# Patient Record
Sex: FEMALE | Race: BLACK OR AFRICAN AMERICAN | NOT HISPANIC OR LATINO
[De-identification: names, ages, dates, MRNs, and addresses within clinical notes are randomized per-mention and may not be internally consistent; named-entity substitution may affect disease eponyms.]

---

## 2023-05-22 ENCOUNTER — APPOINTMENT (OUTPATIENT)
Dept: ORTHOPEDIC SURGERY | Facility: CLINIC | Age: 68
End: 2023-05-22
Payer: MEDICARE

## 2023-05-22 PROBLEM — Z00.00 ENCOUNTER FOR PREVENTIVE HEALTH EXAMINATION: Status: ACTIVE | Noted: 2023-05-22

## 2023-05-22 PROCEDURE — 99203 OFFICE O/P NEW LOW 30 MIN: CPT

## 2023-05-22 RX ORDER — CELECOXIB 200 MG/1
200 CAPSULE ORAL
Qty: 30 | Refills: 0 | Status: ACTIVE | COMMUNITY
Start: 2023-05-22 | End: 1900-01-01

## 2023-05-22 NOTE — PHYSICAL EXAM
[de-identified] : Right knee on exam today there is definite warmth.  She has tenderness with compression of the patellofemoral articulation range of motion 0 to 125 degrees.  The knee is stable to stress varus valgus stress in both full extension and 90 degrees of flexion.  Patient has an intact extensor mechanism.  There is some tenderness with pression of the medial joint line as well. [de-identified] : Knee radiographs were ordered today AP standing individual lateral sunrise views were obtained patient shows significant narrowing of the patellofemoral articulation on the sunrise view mild narrowing of the medial compartment on standing AP projection.  No evidence of any recent fracture injury or trauma.

## 2023-05-22 NOTE — REASON FOR VISIT
[Initial Visit] : an initial visit for [Knee Pain] : knee pain [FreeTextEntry2] : She fell on her knee on may 5th and has had pain ever since. she went to a [facility] where xrays were done.

## 2023-05-22 NOTE — HISTORY OF PRESENT ILLNESS
[de-identified] : First-time visit for this 67-year-old female who injured her right knee approximately May 5.  [place]  She been having discomfort in the anterior aspect of the knee ever since she also has medial sided knee pain.  She has difficulty going up and down stairs and getting out of a seated position some slight mild improvement in the last few days but still quite painful.

## 2023-05-22 NOTE — DISCUSSION/SUMMARY
[Medication Risks Reviewed] : Medication risks reviewed [de-identified] : Talked at length with the patient today about the underlying etiology of her right knee pain.  She has some mild degenerative changes on radiographs but she states she had previous had no discomfort in her knee.  I told her that by falling onto the knee she has produced increased swelling and inflammation in the area.  This could be self-limiting I told her however could take a few more weeks if not months for all her symptoms to resolve.  In order to help speed up the recovery we will place her on Celebrex 2 mg p.o. twice daily for 5-day course and to be taken thereafter as needed.  Reasonable risk benefits of medication were discussed in detail including potential side effects.  We reviewed the current medication profile there appears to be no contraindication to its use.\par \par Addition to the medication patient is told to ice the knee twice a day for 7 days she will follow-up with me in a week if not thoroughly improved at that point may consider cortisone injection if she still is is symptomatic.\par \par Today's consultation lasted 45 minutes

## 2023-05-24 RX ORDER — CELECOXIB 200 MG/1
200 CAPSULE ORAL
Qty: 30 | Refills: 2 | Status: ACTIVE | COMMUNITY
Start: 2023-05-24 | End: 1900-01-01

## 2023-07-25 ENCOUNTER — APPOINTMENT (OUTPATIENT)
Dept: OTOLARYNGOLOGY | Facility: CLINIC | Age: 68
End: 2023-07-25
Payer: MEDICARE

## 2023-07-25 VITALS
HEIGHT: 66 IN | WEIGHT: 176 LBS | OXYGEN SATURATION: 99 % | DIASTOLIC BLOOD PRESSURE: 84 MMHG | HEART RATE: 87 BPM | BODY MASS INDEX: 28.28 KG/M2 | SYSTOLIC BLOOD PRESSURE: 124 MMHG | TEMPERATURE: 97.2 F

## 2023-07-25 DIAGNOSIS — Z80.9 FAMILY HISTORY OF MALIGNANT NEOPLASM, UNSPECIFIED: ICD-10-CM

## 2023-07-25 DIAGNOSIS — Z82.49 FAMILY HISTORY OF ISCHEMIC HEART DISEASE AND OTHER DISEASES OF THE CIRCULATORY SYSTEM: ICD-10-CM

## 2023-07-25 DIAGNOSIS — Z78.9 OTHER SPECIFIED HEALTH STATUS: ICD-10-CM

## 2023-07-25 PROCEDURE — 99203 OFFICE O/P NEW LOW 30 MIN: CPT | Mod: 25

## 2023-07-25 PROCEDURE — 69210 REMOVE IMPACTED EAR WAX UNI: CPT

## 2023-07-25 RX ORDER — VALGANCICLOVIR 450 MG/1
TABLET, FILM COATED ORAL
Refills: 0 | Status: ACTIVE | COMMUNITY

## 2023-07-25 RX ORDER — ASCORBIC ACID 500 MG
TABLET ORAL
Refills: 0 | Status: ACTIVE | COMMUNITY

## 2023-07-28 NOTE — PHYSICAL EXAM
[Normal] : temporomandibular joint is normal [] : septum deviated bilaterally [de-identified] : cerumen impaction AU and otalgia for 3wks [de-identified] : wax removal

## 2023-07-28 NOTE — CONSULT LETTER
[Dear  ___] : Dear  [unfilled], [Consult Letter:] : I had the pleasure of evaluating your patient, [unfilled]. [Please see my note below.] : Please see my note below. [Consult Closing:] : Thank you very much for allowing me to participate in the care of this patient.  If you have any questions, please do not hesitate to contact me. [Sincerely,] : Sincerely, [FreeTextEntry3] : Marcial Bacon MD, FACS\par Professor of Otolaryngology, [facility] [employer] at [facility]\par Director, [facility], Department of Otolaryngology, [facility]\par , Head & Neck Service Line, [facility]\par

## 2023-07-28 NOTE — REASON FOR VISIT
[Initial Evaluation] : an initial evaluation for [FreeTextEntry2] : cerumen impaction AU and otalgia for 3wks

## 2024-07-25 ENCOUNTER — APPOINTMENT (OUTPATIENT)
Dept: OTOLARYNGOLOGY | Facility: CLINIC | Age: 69
End: 2024-07-25